# Patient Record
Sex: FEMALE | Race: BLACK OR AFRICAN AMERICAN | Employment: OTHER | URBAN - METROPOLITAN AREA
[De-identification: names, ages, dates, MRNs, and addresses within clinical notes are randomized per-mention and may not be internally consistent; named-entity substitution may affect disease eponyms.]

---

## 2022-03-18 PROBLEM — M10.9 GOUT: Status: ACTIVE | Noted: 2018-03-12

## 2022-03-18 PROBLEM — S32.009A LUMBAR TRANSVERSE PROCESS FRACTURE, CLOSED, INITIAL ENCOUNTER (HCC): Status: ACTIVE | Noted: 2017-10-04

## 2022-03-18 PROBLEM — L30.4 INTERTRIGO: Status: ACTIVE | Noted: 2018-08-14

## 2022-03-18 PROBLEM — E11.40 TYPE 2 DIABETES MELLITUS WITH DIABETIC NEUROPATHY (HCC): Status: ACTIVE | Noted: 2018-05-09

## 2022-03-19 PROBLEM — E66.09 NON MORBID OBESITY DUE TO EXCESS CALORIES: Status: ACTIVE | Noted: 2019-05-23

## 2022-03-19 PROBLEM — S32.050A CLOSED COMPRESSION FRACTURE OF FIFTH LUMBAR VERTEBRA (HCC): Status: ACTIVE | Noted: 2017-10-04

## 2022-11-02 ENCOUNTER — OFFICE VISIT (OUTPATIENT)
Dept: OBGYN CLINIC | Age: 62
End: 2022-11-02
Payer: COMMERCIAL

## 2022-11-02 VITALS
DIASTOLIC BLOOD PRESSURE: 84 MMHG | BODY MASS INDEX: 25.47 KG/M2 | HEIGHT: 72 IN | WEIGHT: 188 LBS | SYSTOLIC BLOOD PRESSURE: 148 MMHG

## 2022-11-02 DIAGNOSIS — Z12.31 SCREENING MAMMOGRAM FOR BREAST CANCER: ICD-10-CM

## 2022-11-02 DIAGNOSIS — Z01.419 WELL WOMAN EXAM WITH ROUTINE GYNECOLOGICAL EXAM: Primary | ICD-10-CM

## 2022-11-02 PROCEDURE — 99396 PREV VISIT EST AGE 40-64: CPT | Performed by: OBSTETRICS & GYNECOLOGY

## 2022-11-02 PROCEDURE — 3078F DIAST BP <80 MM HG: CPT | Performed by: OBSTETRICS & GYNECOLOGY

## 2022-11-02 PROCEDURE — 3074F SYST BP LT 130 MM HG: CPT | Performed by: OBSTETRICS & GYNECOLOGY

## 2022-11-02 RX ORDER — GABAPENTIN 100 MG/1
CAPSULE ORAL
COMMUNITY

## 2022-11-02 RX ORDER — CONJUGATED ESTROGENS 0.62 MG/G
CREAM VAGINAL
COMMUNITY
Start: 2021-09-21

## 2022-11-02 RX ORDER — SACCHAROMYCES BOULARDII 250 MG
CAPSULE ORAL DAILY
COMMUNITY
Start: 2021-09-23

## 2022-11-02 RX ORDER — LOSARTAN POTASSIUM 100 MG/1
TABLET ORAL
COMMUNITY
Start: 2021-05-24

## 2022-11-02 NOTE — PROGRESS NOTES
Ryan Newsome  is a 57 y.o. No obstetric history on file. who is here for an annual exam.      History  Past Medical History:   Diagnosis Date    Abnormal Pap smear     Arthritis     Depression     Diabetes ([address] 1990s    type 2. pt does not ck blood sugars on reg basis. Oral agent only    Fibroid, uterine 2012    small     Gout     Hypercholesterolemia     Hypertension     controlled w/med    Non morbid obesity due to excess calories 5/23/2019    PMB (postmenopausal bleeding)     Psychiatric disorder     ON FILE  Past Surgical History:   Procedure Laterality Date    GYN  2012    hysterectomy    LAP,TUBAL CAUTERY      OTHER SURGICAL HISTORY  2 2015    tooth removed    PRESENT IN FILE  Current Outpatient Medications on File Prior to Visit   Medication Sig Dispense Refill    losartan (COZAAR) 100 MG tablet losartan 100 mg tablet      saccharomyces boulardii (FLORASTOR) 250 MG capsule Take by mouth daily      estrogens conjugated (PREMARIN) 0.625 MG/GM CREA vaginal cream INSERT 1.5 GRAMS VAGINALLY EVERY NIGHT FOR 14 DAYS      allopurinol (ZYLOPRIM) 100 MG tablet Take 100 mg by mouth daily      ammonium lactate (LAC-HYDRIN) 12 % lotion Apply topically 2 (two) times a day as needed for dry skin or irritation.       atorvastatin (LIPITOR) 20 MG tablet Take by mouth daily      vitamin D (CHOLECALCIFEROL) 25 MCG (1000 UT) TABS tablet Take by mouth daily      cyclobenzaprine (FLEXERIL) 10 MG tablet Take 10 mg by mouth 3 times daily as needed      estradiol (ESTRACE) 0.5 MG tablet Take 0.5 mg by mouth daily      FLUoxetine (PROZAC) 20 MG capsule Take 20 mg by mouth daily      metFORMIN (GLUCOPHAGE) 500 MG tablet Take 500 mg by mouth 2 times daily (with meals)      traMADol (ULTRAM) 50 MG tablet TK 1 T PO Q 8 H PRN      gabapentin (NEURONTIN) 100 MG capsule gabapentin 100 mg capsule      vitamin E 400 UNIT capsule Take 400 Units by mouth daily (Patient not taking: Reported on 11/2/2022)       No current facility-administered medications on file prior to visit. SEE UPDATED LIST  Allergies   Allergen Reactions    Azithromycin Other (See Comments)    Fexofenadine     Hydrogen Peroxide Swelling    Sulfa Antibiotics Other (See Comments)     Severe stomach cramps    Famotidine Rash    Hydrocodone-Acetaminophen Nausea And Vomiting    Procaine Nausea And Vomiting   SEE LIST  Social History     Tobacco Use    Smoking status: Never    Smokeless tobacco: Never   Substance Use Topics    Alcohol use: No      Family History   Problem Relation Age of Onset    Collagen Disease Neg Hx     Ovarian Cancer Neg Hx     Breast Cancer Maternal Aunt         over 48    Kidney Disease Maternal Aunt     Cancer Father         Lung    Stroke Maternal Grandmother     Heart Disease Maternal Grandmother     Heart Disease Sister     Delayed Awakening Sister     Stroke Mother     Delayed Awakening Mother     Heart Disease Mother      OBGYN History:             Physical Exam  Blood pressure (!) 148/84, height 6' (1.829 m), weight 188 lb (85.3 kg). Body mass index is 25.5 kg/m². No results found for: HGB, HGBP   @LASTPROCAMB([id number])@  No results found for: HCGUQC, PREGU, HCGQR, THCGA1    HEENT unremarkable. Sclera non-icteric. Neck is supple without thyromegaly or nodes. Chest clear to auscultation. Heart regular rate and rhythm with no murmur, Breast exam reveals no masses or nipple discharge. No axillary notes are palpable. Abdomen is benign. BUS is normal.  Cervix IF  present. Pap smeaR performed. PRN  Bimanual exam reveals no masses. Assessment  57 y.o. No obstetric history on file. for annual exam.  Encounter Diagnoses   Name Primary?     Screening mammogram for breast cancer     Well woman exam with routine gynecological exam Yes       Plan  Orders Placed This Encounter   Procedures    TINY DIGITAL SCREEN W OR WO CAD BILATERAL     Standing Status:   Future     Standing Expiration Date:   1/2/2024   Sp dexa  sp hyst renal cyst 2018 labs other  col due 2027 tr delroy   \"DEXA ordered\",AT AGE 59          \"Screening olonoscopy ordered\",IF >46YO  DUE         \"Recommend Calcium/MVI\",IF >35YRS  \"Recommend Gardisil immunization\"IF AGE 9-45     }CONTRACEPTION DISCUSSED      STD CHECK OFFERED IF <32YO  ,MAMMOGRAM SCHEDULED IF >41YO          MOOD DISCUSSED             NOT SUICIDAL  HEALTH MEASURE DISCUSSED INCLUDING TEETH/EYE Nafisa Mejias  APPTS                    DIET/EXERCISE /SLEEP    DISCUSSED                SMOKING DISCUSSED PRN      BLADDER CONTROL DISCUSSED

## 2023-03-29 RX ORDER — ESTRADIOL 0.5 MG/1
TABLET ORAL
Qty: 90 TABLET | Refills: 0 | OUTPATIENT
Start: 2023-03-29

## 2024-08-14 ENCOUNTER — OFFICE VISIT (OUTPATIENT)
Dept: OBGYN CLINIC | Age: 64
End: 2024-08-14
Payer: COMMERCIAL

## 2024-08-14 VITALS — WEIGHT: 175 LBS | SYSTOLIC BLOOD PRESSURE: 142 MMHG | DIASTOLIC BLOOD PRESSURE: 86 MMHG | BODY MASS INDEX: 23.73 KG/M2

## 2024-08-14 DIAGNOSIS — Z12.4 SCREENING FOR CERVICAL CANCER: ICD-10-CM

## 2024-08-14 DIAGNOSIS — N95.2 VAGINAL ATROPHY: Primary | ICD-10-CM

## 2024-08-14 DIAGNOSIS — N28.1 RENAL CYST: ICD-10-CM

## 2024-08-14 DIAGNOSIS — Z12.31 SCREENING MAMMOGRAM FOR BREAST CANCER: ICD-10-CM

## 2024-08-14 PROCEDURE — 3079F DIAST BP 80-89 MM HG: CPT | Performed by: OBSTETRICS & GYNECOLOGY

## 2024-08-14 PROCEDURE — 3077F SYST BP >= 140 MM HG: CPT | Performed by: OBSTETRICS & GYNECOLOGY

## 2024-08-14 PROCEDURE — 99214 OFFICE O/P EST MOD 30 MIN: CPT | Performed by: OBSTETRICS & GYNECOLOGY

## 2024-08-14 PROCEDURE — 1123F ACP DISCUSS/DSCN MKR DOCD: CPT | Performed by: OBSTETRICS & GYNECOLOGY

## 2024-08-14 RX ORDER — SOLIFENACIN SUCCINATE 10 MG/1
5 TABLET, FILM COATED ORAL NIGHTLY
COMMUNITY

## 2024-08-14 RX ORDER — MINOCYCLINE HYDROCHLORIDE 50 MG/1
50 CAPSULE ORAL DAILY
COMMUNITY

## 2024-08-14 RX ORDER — FEBUXOSTAT 40 MG/1
40 TABLET, FILM COATED ORAL DAILY
COMMUNITY

## 2024-08-14 RX ORDER — ASPIRIN 325 MG
325 TABLET, DELAYED RELEASE (ENTERIC COATED) ORAL DAILY
COMMUNITY

## 2024-08-14 RX ORDER — FERROUS SULFATE 325(65) MG
325 TABLET ORAL
COMMUNITY

## 2024-08-14 NOTE — PROGRESS NOTES
The patient is here for  problems including vaginal atrophy, renal cyst     HISTORY:      No LMP recorded. Patient has had a hysterectomy.SEE BELOW      Current Outpatient Medications on File Prior to Visit   Medication Sig Dispense Refill    febuxostat (ULORIC) 40 MG TABS tablet Take 1 tablet by mouth daily      Multiple Vitamins-Minerals (ONE A DAY WOMEN 50 PLUS PO) Take by mouth      aspirin 325 MG EC tablet Take 1 tablet by mouth daily      solifenacin (VESICARE) 10 MG tablet Take 0.5 tablets by mouth at bedtime @ 9:00 pm      minocycline (MINOCIN;DYNACIN) 50 MG capsule Take 1 capsule by mouth daily      ferrous sulfate (IRON 325) 325 (65 Fe) MG tablet Take 1 tablet by mouth daily (with breakfast)      gabapentin (NEURONTIN) 100 MG capsule gabapentin 100 mg capsule      losartan (COZAAR) 100 MG tablet losartan 100 mg tablet      estrogens conjugated (PREMARIN) 0.625 MG/GM CREA vaginal cream INSERT 1.5 GRAMS VAGINALLY EVERY NIGHT FOR 14 DAYS      allopurinol (ZYLOPRIM) 100 MG tablet Take 1 tablet by mouth daily      atorvastatin (LIPITOR) 20 MG tablet Take by mouth daily      vitamin D (CHOLECALCIFEROL) 25 MCG (1000 UT) TABS tablet Take by mouth daily      cyclobenzaprine (FLEXERIL) 10 MG tablet Take 1 tablet by mouth 3 times daily as needed      estradiol (ESTRACE) 0.5 MG tablet Take 1 tablet by mouth daily      FLUoxetine (PROZAC) 20 MG capsule Take 1 capsule by mouth daily      metFORMIN (GLUCOPHAGE) 500 MG tablet Take 1 tablet by mouth 2 times daily (with meals)      saccharomyces boulardii (FLORASTOR) 250 MG capsule Take by mouth daily      ammonium lactate (LAC-HYDRIN) 12 % lotion Apply topically 2 (two) times a day as needed for dry skin or irritation.      traMADol (ULTRAM) 50 MG tablet TK 1 T PO Q 8 H PRN      vitamin E 400 UNIT capsule Take 400 Units by mouth daily (Patient not taking: Reported on 11/2/2022)       No current facility-administered medications on file prior to visit.   SEE

## 2025-02-19 ENCOUNTER — TELEPHONE (OUTPATIENT)
Dept: OBGYN CLINIC | Age: 65
End: 2025-02-19

## 2025-02-19 RX ORDER — ESTRADIOL 0.5 MG/1
0.5 TABLET ORAL DAILY
Qty: 90 TABLET | Refills: 1 | Status: SHIPPED | OUTPATIENT
Start: 2025-02-19

## 2025-02-19 NOTE — TELEPHONE ENCOUNTER
Patient called office, left  requesting refill of Estradiol 0.5mg. Refill sent to preferred pharmacy.

## 2025-03-04 ENCOUNTER — TELEPHONE (OUTPATIENT)
Dept: OBGYN CLINIC | Age: 65
End: 2025-03-04

## 2025-03-04 NOTE — TELEPHONE ENCOUNTER
Pt called the office requesting refills on her estradiol 0.5 sent to her pharmacy on file. A prescription was sent on 2/19/2025 90 days with 1 refill advised pt to check with her pharmacy and to let us know if she has a problem.

## 2025-03-25 ENCOUNTER — TELEPHONE (OUTPATIENT)
Dept: OBGYN CLINIC | Age: 65
End: 2025-03-25

## 2025-07-09 RX ORDER — ESTRADIOL 0.5 MG/1
0.5 TABLET ORAL DAILY
Qty: 90 TABLET | Refills: 0 | Status: SHIPPED | OUTPATIENT
Start: 2025-07-09

## 2025-07-09 NOTE — TELEPHONE ENCOUNTER
Previous Dr Law pt, seeing Dr Rodrigues for annual in August.   TC from pt needing refills of HRT (estradiol) to get to upcoming appt.  Refills sent to pharmacy on file.

## 2025-08-04 ENCOUNTER — TELEPHONE (OUTPATIENT)
Dept: OBGYN CLINIC | Age: 65
End: 2025-08-04

## 2025-08-18 ENCOUNTER — OFFICE VISIT (OUTPATIENT)
Dept: OBGYN CLINIC | Age: 65
End: 2025-08-18
Payer: COMMERCIAL

## 2025-08-18 VITALS
DIASTOLIC BLOOD PRESSURE: 80 MMHG | HEIGHT: 72 IN | SYSTOLIC BLOOD PRESSURE: 146 MMHG | WEIGHT: 176.4 LBS | BODY MASS INDEX: 23.89 KG/M2

## 2025-08-18 DIAGNOSIS — Z12.31 SCREENING MAMMOGRAM FOR BREAST CANCER: Primary | ICD-10-CM

## 2025-08-18 PROCEDURE — 3077F SYST BP >= 140 MM HG: CPT | Performed by: OBSTETRICS & GYNECOLOGY

## 2025-08-18 PROCEDURE — 3079F DIAST BP 80-89 MM HG: CPT | Performed by: OBSTETRICS & GYNECOLOGY

## 2025-08-18 PROCEDURE — 99397 PER PM REEVAL EST PAT 65+ YR: CPT | Performed by: OBSTETRICS & GYNECOLOGY

## 2025-08-18 RX ORDER — ESTRADIOL 0.5 MG/1
0.5 TABLET ORAL DAILY
Qty: 90 TABLET | Refills: 3 | Status: SHIPPED | OUTPATIENT
Start: 2025-08-18

## 2025-08-18 RX ORDER — CONJUGATED ESTROGENS 0.62 MG/G
1 CREAM VAGINAL DAILY
Qty: 60 G | Refills: 5 | Status: SHIPPED | OUTPATIENT
Start: 2025-08-18

## 2025-08-18 SDOH — ECONOMIC STABILITY: FOOD INSECURITY: WITHIN THE PAST 12 MONTHS, THE FOOD YOU BOUGHT JUST DIDN'T LAST AND YOU DIDN'T HAVE MONEY TO GET MORE.: NEVER TRUE

## 2025-08-18 SDOH — ECONOMIC STABILITY: FOOD INSECURITY: WITHIN THE PAST 12 MONTHS, YOU WORRIED THAT YOUR FOOD WOULD RUN OUT BEFORE YOU GOT MONEY TO BUY MORE.: NEVER TRUE

## 2025-08-18 ASSESSMENT — PATIENT HEALTH QUESTIONNAIRE - PHQ9
9. THOUGHTS THAT YOU WOULD BE BETTER OFF DEAD, OR OF HURTING YOURSELF: NOT AT ALL
2. FEELING DOWN, DEPRESSED OR HOPELESS: NOT AT ALL
8. MOVING OR SPEAKING SO SLOWLY THAT OTHER PEOPLE COULD HAVE NOTICED. OR THE OPPOSITE, BEING SO FIGETY OR RESTLESS THAT YOU HAVE BEEN MOVING AROUND A LOT MORE THAN USUAL: NOT AT ALL
3. TROUBLE FALLING OR STAYING ASLEEP: NOT AT ALL
SUM OF ALL RESPONSES TO PHQ QUESTIONS 1-9: 0
5. POOR APPETITE OR OVEREATING: NOT AT ALL
4. FEELING TIRED OR HAVING LITTLE ENERGY: NOT AT ALL
SUM OF ALL RESPONSES TO PHQ QUESTIONS 1-9: 0
SUM OF ALL RESPONSES TO PHQ QUESTIONS 1-9: 0
7. TROUBLE CONCENTRATING ON THINGS, SUCH AS READING THE NEWSPAPER OR WATCHING TELEVISION: NOT AT ALL
10. IF YOU CHECKED OFF ANY PROBLEMS, HOW DIFFICULT HAVE THESE PROBLEMS MADE IT FOR YOU TO DO YOUR WORK, TAKE CARE OF THINGS AT HOME, OR GET ALONG WITH OTHER PEOPLE: NOT DIFFICULT AT ALL
6. FEELING BAD ABOUT YOURSELF - OR THAT YOU ARE A FAILURE OR HAVE LET YOURSELF OR YOUR FAMILY DOWN: NOT AT ALL
SUM OF ALL RESPONSES TO PHQ QUESTIONS 1-9: 0
1. LITTLE INTEREST OR PLEASURE IN DOING THINGS: NOT AT ALL